# Patient Record
Sex: FEMALE | Race: WHITE | NOT HISPANIC OR LATINO | URBAN - METROPOLITAN AREA
[De-identification: names, ages, dates, MRNs, and addresses within clinical notes are randomized per-mention and may not be internally consistent; named-entity substitution may affect disease eponyms.]

---

## 2022-04-19 ENCOUNTER — OFFICE (OUTPATIENT)
Dept: URBAN - METROPOLITAN AREA CLINIC 84 | Facility: CLINIC | Age: 42
End: 2022-04-19

## 2022-04-19 VITALS
SYSTOLIC BLOOD PRESSURE: 130 MMHG | WEIGHT: 186 LBS | DIASTOLIC BLOOD PRESSURE: 84 MMHG | HEIGHT: 68 IN | HEART RATE: 84 BPM

## 2022-04-19 DIAGNOSIS — R12 HEARTBURN: ICD-10-CM

## 2022-04-19 DIAGNOSIS — R14.0 ABDOMINAL DISTENSION (GASEOUS): ICD-10-CM

## 2022-04-19 DIAGNOSIS — R10.33 PERIUMBILICAL PAIN: ICD-10-CM

## 2022-04-19 DIAGNOSIS — R10.11 RIGHT UPPER QUADRANT PAIN: ICD-10-CM

## 2022-04-19 DIAGNOSIS — K76.0 FATTY (CHANGE OF) LIVER, NOT ELSEWHERE CLASSIFIED: ICD-10-CM

## 2022-04-19 PROCEDURE — 99214 OFFICE O/P EST MOD 30 MIN: CPT | Performed by: NURSE PRACTITIONER

## 2022-04-19 RX ORDER — OMEPRAZOLE 40 MG/1
40 CAPSULE, DELAYED RELEASE ORAL
Qty: 30 | Refills: 0 | Status: ACTIVE
Start: 2022-04-19

## 2023-11-20 ENCOUNTER — OFFICE (OUTPATIENT)
Dept: URBAN - METROPOLITAN AREA CLINIC 67 | Facility: CLINIC | Age: 43
End: 2023-11-20
Payer: COMMERCIAL

## 2023-11-20 VITALS
HEART RATE: 83 BPM | DIASTOLIC BLOOD PRESSURE: 82 MMHG | DIASTOLIC BLOOD PRESSURE: 82 MMHG | SYSTOLIC BLOOD PRESSURE: 110 MMHG | HEIGHT: 68 IN | DIASTOLIC BLOOD PRESSURE: 82 MMHG | HEART RATE: 83 BPM | WEIGHT: 188 LBS | SYSTOLIC BLOOD PRESSURE: 110 MMHG | SYSTOLIC BLOOD PRESSURE: 110 MMHG | WEIGHT: 188 LBS | OXYGEN SATURATION: 99 % | HEIGHT: 68 IN | OXYGEN SATURATION: 99 % | HEART RATE: 83 BPM | HEIGHT: 68 IN | OXYGEN SATURATION: 99 % | WEIGHT: 188 LBS

## 2023-11-20 DIAGNOSIS — R10.13 EPIGASTRIC PAIN: ICD-10-CM

## 2023-11-20 DIAGNOSIS — R14.0 ABDOMINAL DISTENSION (GASEOUS): ICD-10-CM

## 2023-11-20 PROCEDURE — 99204 OFFICE O/P NEW MOD 45 MIN: CPT | Performed by: INTERNAL MEDICINE

## 2023-11-20 PROCEDURE — 99214 OFFICE O/P EST MOD 30 MIN: CPT | Performed by: INTERNAL MEDICINE

## 2023-11-20 NOTE — SERVICEHPINOTES
Wendy Zaragoza   is seen today, after a bit of an absence,   regarding chronic abdominal discomfort and after a recent ER visit. She was seen in the [facility] on 11/16/23 with upper abdominal discomfort. brBlood work demonstrated a normal CBC, liver enzymes, lipase and negative pregnancy test. A contrasted CT of the abd/pelvis was without any acute abnormality. She was discharged home with a prescription for Carafate and today she feels that her symptoms are somewhat improved but not gone completely - she was also seen at a local walk-in clinic about 2 weeks ago (prior to her ER visit) and was placed on Omeprazole 40mg/day.
br She reports that her discomfort it located in the epigastrium and is best described as "burning and ache-like" - it is essentially always present with a fluctuating intensity but no obvious pattern in terms of eating, activity of time of day. She denies any history of PUD or excessive NSAID use. She denies any emesis, unexplained weight loss or GI tract bleeding symptoms but she does relate some associated bloating.An EGD done in 8/2017 (secondary to epigastric and RUQ discomfort) was unremarkable - gastric biopsies were without evidence of H. pylori infection and small bowel biopsies were without evidence of celiac disease. brA HIDA scan done in 5/2020 was normal and her tTG IgA Ab titer was normal/negative in 4/2022.

## 2023-11-20 NOTE — SERVICEHPINOTES
Wendy Zaragoza   is seen today, after a bit of an absence,   regarding chronic abdominal discomfort and after a recent ER visit. She was seen in the [place] ER on 11/16/23 with upper abdominal discomfort. brBlood work demonstrated a normal CBC, liver enzymes, lipase and negative pregnancy test. A contrasted CT of the abd/pelvis was without any acute abnormality. She was discharged home with a prescription for Carafate and today she feels that her symptoms are somewhat improved but not gone completely - she was also seen at a local walk-in clinic about 2 weeks ago (prior to her ER visit) and was placed on Omeprazole 40mg/day.
br She reports that her discomfort it located in the epigastrium and is best described as "burning and ache-like" - it is essentially always present with a fluctuating intensity but no obvious pattern in terms of eating, activity of time of day. She denies any history of PUD or excessive NSAID use. She denies any emesis, unexplained weight loss or GI tract bleeding symptoms but she does relate some associated bloating.An EGD done in 8/2017 (secondary to epigastric and RUQ discomfort) was unremarkable - gastric biopsies were without evidence of H. pylori infection and small bowel biopsies were without evidence of celiac disease. brA HIDA scan done in 5/2020 was normal and her tTG IgA Ab titer was normal/negative in 4/2022.

## 2023-11-20 NOTE — SERVICEHPINOTES
Wendy Zaragoza   is seen today, after a bit of an absence,   regarding chronic abdominal discomfort and after a recent ER visit. She was seen in the [place] ER on 11/16/23 with upper abdominal discomfort. brBlood work demonstrated a normal CBC, liver enzymes, lipase and negative pregnancy test. A contrasted CT of the abd/pelvis was without any acute abnormality. She was discharged home with a prescription for Carafate and today she feels that her symptoms are somewhat improved but not gone completely - she was also seen at a local walk-in clinic about 2 weeks ago (prior to her ER visit) and was placed on Omeprazole 40mg/day.
br She reports that her discomfort it located in the epigastrium and is best described as "burning and ache-like" - it is essentially always present with a fluctuating intensity but no obvious pattern in terms of eating, activity of time of day. She denies any history of PUD or excessive NSAID use. She denies any emesis, unexplained weight loss or GI tract bleeding symptoms but she does relate some associated bloating.An EGD done in 8/2017 (secondary to epigastric and RUQ discomfort) was unremarkable - gastric biopsies were without evidence of H. pylori infection and small bowel biopsies were without evidence of celiac disease. brA HIDA scan done in 5/2020 was normal and her tTG IgA Ab titer was normal/negative in 4/2022.

## 2023-11-20 NOTE — SERVICENOTES
We will plan for an EGD (with biopsies for H. pylori) as the next step in her evaluation given her ongoing discomfort despite PPI therapy.

## 2024-06-12 ENCOUNTER — AMBULATORY SURGICAL CENTER (OUTPATIENT)
Dept: URBAN - METROPOLITAN AREA SURGERY 19 | Facility: SURGERY | Age: 44
End: 2024-06-12
Payer: COMMERCIAL

## 2024-06-12 ENCOUNTER — OFFICE (OUTPATIENT)
Dept: URBAN - METROPOLITAN AREA PATHOLOGY 10 | Facility: PATHOLOGY | Age: 44
End: 2024-06-12
Payer: COMMERCIAL

## 2024-06-12 DIAGNOSIS — K31.89 OTHER DISEASES OF STOMACH AND DUODENUM: ICD-10-CM

## 2024-06-12 PROCEDURE — 88305 TISSUE EXAM BY PATHOLOGIST: CPT | Mod: TC | Performed by: STUDENT IN AN ORGANIZED HEALTH CARE EDUCATION/TRAINING PROGRAM

## 2024-06-12 PROCEDURE — 43239 EGD BIOPSY SINGLE/MULTIPLE: CPT | Performed by: INTERNAL MEDICINE
